# Patient Record
Sex: MALE | Race: BLACK OR AFRICAN AMERICAN
[De-identification: names, ages, dates, MRNs, and addresses within clinical notes are randomized per-mention and may not be internally consistent; named-entity substitution may affect disease eponyms.]

---

## 2019-07-11 ENCOUNTER — HOSPITAL ENCOUNTER (OUTPATIENT)
Dept: HOSPITAL 18 - NAV RAD | Age: 54
Discharge: HOME | End: 2019-07-11
Attending: INTERNAL MEDICINE
Payer: COMMERCIAL

## 2019-07-11 DIAGNOSIS — M16.11: ICD-10-CM

## 2019-07-11 DIAGNOSIS — M17.11: Primary | ICD-10-CM

## 2019-07-11 NOTE — RAD
RIGHT KNEE FOUR VIEWS:

 

HISTORY:

Knee pain.

 

FINDINGS:

There are severe degenerative changes noted.  There is loss of both medial and lateral joint space, m
ore severe medially.  Prominent spurring from the femoral and tibial condyles, more prominent mediall
y.  Prominent spurring and degenerative changes at the patellofemoral joint.  No evidence of signific
ant joint effusion.

 

IMPRESSION:

Moderate to severe degenerative changes noted.

 

POS: [facility]

## 2019-07-11 NOTE — RAD
RIGHT HIP TWO VIEWS:

 

HISTORY:

Osteoarthritis.

 

FINDINGS:

Osteoarthritic changes are noted at the hip with mild spurring from the femoral head and acetabulum. 
 Mild medial joint narrowing.  The femoral head contour is preserved.  No fracture.  No acute osseous
 abnormality.

 

IMPRESSION:

Mild degenerative changes, right hip.

 

POS: [facility]